# Patient Record
Sex: MALE | Race: BLACK OR AFRICAN AMERICAN | URBAN - METROPOLITAN AREA
[De-identification: names, ages, dates, MRNs, and addresses within clinical notes are randomized per-mention and may not be internally consistent; named-entity substitution may affect disease eponyms.]

---

## 2019-03-28 ENCOUNTER — HISTORICAL (OUTPATIENT)
Dept: ADMINISTRATIVE | Facility: HOSPITAL | Age: 39
End: 2019-03-28

## 2019-03-28 LAB
BUN SERPL-MCNC: 11 MG/DL (ref 7–18)
CALCIUM SERPL-MCNC: 9 MG/DL (ref 8.5–10.1)
CHLORIDE SERPL-SCNC: 109 MMOL/L (ref 98–107)
CO2 SERPL-SCNC: 29 MMOL/L (ref 21–32)
CREAT SERPL-MCNC: 1 MG/DL (ref 0.6–1.3)
CREAT/UREA NIT SERPL: 11
ERYTHROCYTE [DISTWIDTH] IN BLOOD BY AUTOMATED COUNT: 14.1 % (ref 11.5–14.5)
GLUCOSE SERPL-MCNC: 92 MG/DL (ref 74–106)
HCT VFR BLD AUTO: 42.1 % (ref 40–51)
HGB BLD-MCNC: 13.7 GM/DL (ref 13.5–17.5)
MCH RBC QN AUTO: 28.7 PG (ref 26–34)
MCHC RBC AUTO-ENTMCNC: 32.5 GM/DL (ref 31–37)
MCV RBC AUTO: 88.3 FL (ref 80–100)
PLATELET # BLD AUTO: 220 X10(3)/MCL (ref 130–400)
PMV BLD AUTO: 12.1 FL (ref 7.4–10.4)
POTASSIUM SERPL-SCNC: 4.1 MMOL/L (ref 3.5–5.1)
RBC # BLD AUTO: 4.77 X10(6)/MCL (ref 4.5–5.9)
SODIUM SERPL-SCNC: 139 MMOL/L (ref 136–145)
WBC # SPEC AUTO: 15.1 X10(3)/MCL (ref 4.5–11)

## 2019-04-03 ENCOUNTER — HISTORICAL (OUTPATIENT)
Dept: SURGERY | Facility: HOSPITAL | Age: 39
End: 2019-04-03

## 2019-04-03 LAB
AMPHET UR QL SCN: NEGATIVE
BARBITURATE SCN PRESENT UR: NEGATIVE
BENZODIAZ UR QL SCN: NEGATIVE
CANNABINOIDS UR QL SCN: NEGATIVE
COCAINE UR QL SCN: NEGATIVE
OPIATES UR QL SCN: NEGATIVE
PCP UR QL: NEGATIVE
PH UR STRIP.AUTO: 7 [PH] (ref 5–8)
TEMPERATURE, URINE (OHS): 23 DEGC (ref 20–25)

## 2021-01-07 ENCOUNTER — HOSPITAL ENCOUNTER (EMERGENCY)
Facility: HOSPITAL | Age: 41
Discharge: HOME | End: 2021-01-07
Attending: EMERGENCY MEDICINE

## 2021-01-07 VITALS
OXYGEN SATURATION: 98 % | HEART RATE: 120 BPM | BODY MASS INDEX: 23.86 KG/M2 | TEMPERATURE: 98 F | WEIGHT: 180 LBS | DIASTOLIC BLOOD PRESSURE: 95 MMHG | SYSTOLIC BLOOD PRESSURE: 129 MMHG | RESPIRATION RATE: 22 BRPM | HEIGHT: 73 IN

## 2021-01-07 DIAGNOSIS — R45.1 AGITATION: Primary | ICD-10-CM

## 2021-01-07 PROCEDURE — 99283 EMERGENCY DEPT VISIT LOW MDM: CPT

## 2022-04-10 ENCOUNTER — HISTORICAL (OUTPATIENT)
Dept: ADMINISTRATIVE | Facility: HOSPITAL | Age: 42
End: 2022-04-10

## 2022-04-28 VITALS
WEIGHT: 192.88 LBS | BODY MASS INDEX: 25.56 KG/M2 | DIASTOLIC BLOOD PRESSURE: 77 MMHG | SYSTOLIC BLOOD PRESSURE: 125 MMHG | HEIGHT: 73 IN

## 2022-04-30 NOTE — OP NOTE
DATE OF SURGERY:    04/03/2019    SURGEON:  Yohan Anderson MD    attending physician:  Yohan Anderson MD    PREOPERATIVE DIAGNOSIS:  Right-sided forehead cyst.    POSTOPERATIVE DIAGNOSIS:  Right-sided forehead cyst.    PROCEDURE:  Primary excision epithelial inclusion cyst 2 cm right forehead.    INDICATIONS FOR PROCEDURE:  Mr. Nugent has a slowly growing mass for some time causing psychological distress.  He presents for excision.    ANESTHESIA:  General.    COMPLICATIONS:  None.    PROCEDURE IN DETAIL:  The patient was endotracheally intubated, prepped and draped in the usual sterile fashion.  1% lidocaine with epinephrine was instilled over the cyst.  A 2 cm incision was made using 15 blade scalpel and epithelial inclusion cyst was readily apparent.  This was removed using tenotomy scissors 2 cm.  The area was irrigated out with sterile saline solution.  Hemostasis was achieved using Bovie cautery.  4-0 plain gut suture was used to close the skin.  There was no complication.  I was scrubbed and present for the entire procedure.        ______________________________  Yohan Anderson MD    BF/MODL  DD:  04/03/2019  Time:  09:04AM  DT:  04/03/2019  Time:  09:18AM  Job #:  [id number]